# Patient Record
Sex: FEMALE | Race: WHITE | Employment: STUDENT | URBAN - METROPOLITAN AREA
[De-identification: names, ages, dates, MRNs, and addresses within clinical notes are randomized per-mention and may not be internally consistent; named-entity substitution may affect disease eponyms.]

---

## 2023-10-12 PROBLEM — E66.812 OBESITY, CLASS II, BMI 35-39.9: Status: ACTIVE | Noted: 2023-10-12

## 2024-05-02 ENCOUNTER — OFFICE VISIT (OUTPATIENT)
Dept: INTERNAL MEDICINE CLINIC | Facility: CLINIC | Age: 24
End: 2024-05-02

## 2024-05-02 VITALS
TEMPERATURE: 98.1 F | DIASTOLIC BLOOD PRESSURE: 87 MMHG | HEART RATE: 97 BPM | BODY MASS INDEX: 40.4 KG/M2 | WEIGHT: 214 LBS | SYSTOLIC BLOOD PRESSURE: 121 MMHG | HEIGHT: 61 IN

## 2024-05-02 DIAGNOSIS — Z11.3 SCREEN FOR STD (SEXUALLY TRANSMITTED DISEASE): ICD-10-CM

## 2024-05-02 DIAGNOSIS — R73.03 PREDIABETES: ICD-10-CM

## 2024-05-02 DIAGNOSIS — E66.01 CLASS 3 SEVERE OBESITY DUE TO EXCESS CALORIES WITHOUT SERIOUS COMORBIDITY WITH BODY MASS INDEX (BMI) OF 40.0 TO 44.9 IN ADULT (HCC): ICD-10-CM

## 2024-05-02 DIAGNOSIS — G43.119 INTRACTABLE MIGRAINE WITH AURA WITHOUT STATUS MIGRAINOSUS: Primary | ICD-10-CM

## 2024-05-02 DIAGNOSIS — F41.1 GENERALIZED ANXIETY DISORDER: ICD-10-CM

## 2024-05-02 DIAGNOSIS — L70.8 OTHER ACNE: ICD-10-CM

## 2024-05-02 DIAGNOSIS — L73.2 HIDRADENITIS SUPPURATIVA: ICD-10-CM

## 2024-05-02 DIAGNOSIS — R42 VERTIGO: ICD-10-CM

## 2024-05-02 DIAGNOSIS — R53.83 OTHER FATIGUE: ICD-10-CM

## 2024-05-02 DIAGNOSIS — E28.2 PCOS (POLYCYSTIC OVARIAN SYNDROME): ICD-10-CM

## 2024-05-02 PROCEDURE — 99214 OFFICE O/P EST MOD 30 MIN: CPT | Performed by: PHYSICIAN ASSISTANT

## 2024-05-02 RX ORDER — SUMATRIPTAN 100 MG/1
TABLET, FILM COATED ORAL
Qty: 9 TABLET | Refills: 4 | Status: SHIPPED | OUTPATIENT
Start: 2024-05-02

## 2024-05-02 RX ORDER — MECLIZINE HYDROCHLORIDE 25 MG/1
25 TABLET ORAL EVERY 8 HOURS SCHEDULED
Qty: 60 TABLET | Refills: 2 | Status: SHIPPED | OUTPATIENT
Start: 2024-05-02

## 2024-05-07 PROBLEM — F41.0 PANIC ATTACK: Status: RESOLVED | Noted: 2022-02-04 | Resolved: 2024-05-07

## 2024-05-07 PROBLEM — E66.01 CLASS 3 SEVERE OBESITY DUE TO EXCESS CALORIES WITHOUT SERIOUS COMORBIDITY WITH BODY MASS INDEX (BMI) OF 40.0 TO 44.9 IN ADULT (HCC): Status: ACTIVE | Noted: 2023-10-12

## 2024-05-07 PROBLEM — R42 VERTIGO: Status: ACTIVE | Noted: 2019-06-26

## 2024-05-07 PROBLEM — H69.93 EUSTACHIAN TUBE DYSFUNCTION, BILATERAL: Status: RESOLVED | Noted: 2023-05-16 | Resolved: 2024-05-07

## 2024-05-07 PROBLEM — E66.813 CLASS 3 SEVERE OBESITY DUE TO EXCESS CALORIES WITHOUT SERIOUS COMORBIDITY WITH BODY MASS INDEX (BMI) OF 40.0 TO 44.9 IN ADULT (HCC): Status: ACTIVE | Noted: 2023-10-12

## 2024-05-09 ENCOUNTER — APPOINTMENT (OUTPATIENT)
Dept: LAB | Facility: CLINIC | Age: 24
End: 2024-05-09
Payer: COMMERCIAL

## 2024-05-09 DIAGNOSIS — R53.83 OTHER FATIGUE: ICD-10-CM

## 2024-05-09 DIAGNOSIS — R42 VERTIGO: ICD-10-CM

## 2024-05-09 DIAGNOSIS — E28.2 PCOS (POLYCYSTIC OVARIAN SYNDROME): ICD-10-CM

## 2024-05-09 DIAGNOSIS — Z11.3 SCREEN FOR STD (SEXUALLY TRANSMITTED DISEASE): ICD-10-CM

## 2024-05-09 DIAGNOSIS — R73.03 PREDIABETES: ICD-10-CM

## 2024-05-09 DIAGNOSIS — G43.119 INTRACTABLE MIGRAINE WITH AURA WITHOUT STATUS MIGRAINOSUS: ICD-10-CM

## 2024-05-09 LAB
25(OH)D3 SERPL-MCNC: 18.4 NG/ML (ref 30–100)
ALBUMIN SERPL BCP-MCNC: 4.3 G/DL (ref 3.5–5)
ALP SERPL-CCNC: 62 U/L (ref 34–104)
ALT SERPL W P-5'-P-CCNC: 12 U/L (ref 7–52)
ANION GAP SERPL CALCULATED.3IONS-SCNC: 11 MMOL/L (ref 4–13)
AST SERPL W P-5'-P-CCNC: 15 U/L (ref 13–39)
BASOPHILS # BLD AUTO: 0.04 THOUSANDS/ÂΜL (ref 0–0.1)
BASOPHILS NFR BLD AUTO: 1 % (ref 0–1)
BILIRUB SERPL-MCNC: 0.22 MG/DL (ref 0.2–1)
BUN SERPL-MCNC: 11 MG/DL (ref 5–25)
CALCIUM SERPL-MCNC: 9.1 MG/DL (ref 8.4–10.2)
CHLORIDE SERPL-SCNC: 103 MMOL/L (ref 96–108)
CO2 SERPL-SCNC: 25 MMOL/L (ref 21–32)
CREAT SERPL-MCNC: 0.63 MG/DL (ref 0.6–1.3)
EOSINOPHIL # BLD AUTO: 0.13 THOUSAND/ÂΜL (ref 0–0.61)
EOSINOPHIL NFR BLD AUTO: 2 % (ref 0–6)
ERYTHROCYTE [DISTWIDTH] IN BLOOD BY AUTOMATED COUNT: 12.8 % (ref 11.6–15.1)
EST. AVERAGE GLUCOSE BLD GHB EST-MCNC: 123 MG/DL
GFR SERPL CREATININE-BSD FRML MDRD: 127 ML/MIN/1.73SQ M
GLUCOSE P FAST SERPL-MCNC: 98 MG/DL (ref 65–99)
HBA1C MFR BLD: 5.9 %
HCT VFR BLD AUTO: 40.1 % (ref 34.8–46.1)
HGB BLD-MCNC: 12.9 G/DL (ref 11.5–15.4)
IMM GRANULOCYTES # BLD AUTO: 0.02 THOUSAND/UL (ref 0–0.2)
IMM GRANULOCYTES NFR BLD AUTO: 0 % (ref 0–2)
LYMPHOCYTES # BLD AUTO: 2.57 THOUSANDS/ÂΜL (ref 0.6–4.47)
LYMPHOCYTES NFR BLD AUTO: 33 % (ref 14–44)
MCH RBC QN AUTO: 28.4 PG (ref 26.8–34.3)
MCHC RBC AUTO-ENTMCNC: 32.2 G/DL (ref 31.4–37.4)
MCV RBC AUTO: 88 FL (ref 82–98)
MONOCYTES # BLD AUTO: 0.59 THOUSAND/ÂΜL (ref 0.17–1.22)
MONOCYTES NFR BLD AUTO: 8 % (ref 4–12)
NEUTROPHILS # BLD AUTO: 4.45 THOUSANDS/ÂΜL (ref 1.85–7.62)
NEUTS SEG NFR BLD AUTO: 56 % (ref 43–75)
NRBC BLD AUTO-RTO: 0 /100 WBCS
PLATELET # BLD AUTO: 377 THOUSANDS/UL (ref 149–390)
PMV BLD AUTO: 9.6 FL (ref 8.9–12.7)
POTASSIUM SERPL-SCNC: 3.9 MMOL/L (ref 3.5–5.3)
PROT SERPL-MCNC: 7.7 G/DL (ref 6.4–8.4)
RBC # BLD AUTO: 4.55 MILLION/UL (ref 3.81–5.12)
SODIUM SERPL-SCNC: 139 MMOL/L (ref 135–147)
TSH SERPL DL<=0.05 MIU/L-ACNC: 2.38 UIU/ML (ref 0.45–4.5)
VIT B12 SERPL-MCNC: 138 PG/ML (ref 180–914)
WBC # BLD AUTO: 7.8 THOUSAND/UL (ref 4.31–10.16)

## 2024-05-09 PROCEDURE — 83498 ASY HYDROXYPROGESTERONE 17-D: CPT

## 2024-05-09 PROCEDURE — 36415 COLL VENOUS BLD VENIPUNCTURE: CPT

## 2024-05-09 PROCEDURE — 87591 N.GONORRHOEAE DNA AMP PROB: CPT

## 2024-05-09 PROCEDURE — 83036 HEMOGLOBIN GLYCOSYLATED A1C: CPT

## 2024-05-09 PROCEDURE — 87491 CHLMYD TRACH DNA AMP PROBE: CPT

## 2024-05-09 PROCEDURE — 84402 ASSAY OF FREE TESTOSTERONE: CPT

## 2024-05-09 PROCEDURE — 85025 COMPLETE CBC W/AUTO DIFF WBC: CPT

## 2024-05-09 PROCEDURE — 84443 ASSAY THYROID STIM HORMONE: CPT

## 2024-05-09 PROCEDURE — 82626 DEHYDROEPIANDROSTERONE: CPT

## 2024-05-09 PROCEDURE — 84403 ASSAY OF TOTAL TESTOSTERONE: CPT

## 2024-05-09 PROCEDURE — 82607 VITAMIN B-12: CPT

## 2024-05-09 PROCEDURE — 80053 COMPREHEN METABOLIC PANEL: CPT

## 2024-05-09 PROCEDURE — 82306 VITAMIN D 25 HYDROXY: CPT

## 2024-05-09 NOTE — ASSESSMENT & PLAN NOTE
Lab Results   Component Value Date    HGBA1C 5.9 (H) 12/01/2023      On metformin to treat PCOS as well. Due for repeat A1c. See nutrition and exercise counseling.

## 2024-05-09 NOTE — ASSESSMENT & PLAN NOTE
History of PCOS with signs of insulin resistance. Derm discussed spironolactone, which was prescribed, but patient does not want to start at this time. Still declines trying this medication today. She is on OCPs, prescribed by GYN. She did not increase her metformin 500 mg to TID yet, but states she will start this week. Agreeable to recheck lab work. Also recommend follow up with GYN.

## 2024-05-09 NOTE — ASSESSMENT & PLAN NOTE
Reviewed nutrition and exercise recommendations. She is interested in weight loss medications. Will discuss further pending lab results.

## 2024-05-09 NOTE — ASSESSMENT & PLAN NOTE
Discussed risk factors and screening for ANTONIO. Recommend sleep study. She declines at this time, but states she will call the office if she changes her mind. Will evaluate further with labs.

## 2024-05-09 NOTE — PROGRESS NOTES
Name: Mariella Lowery      : 2000      MRN: [id number]  Encounter Provider: Moira Soliz PA-C  Encounter Date: 2024   Encounter department: [facility]    Assessment & Plan     1. Intractable migraine with aura without status migrainosus  Assessment & Plan:  Unremarkable neuro exam. No red flags. Discussed preventative medication. She is not interested at this time. Agreeable to screening lab work. Imitrex increased to 100 mg prn. Max 200 mg/24 hours. Will switch to Maxalt if necessary at next visit. Take at onset, as soon as possible. Recommend supportive treatment. Ensure adequate hydration and rest. Avoid triggers. Follow up with eye doctor and wear glasses as instructed. Avoid alcohol and caffeine. Encouraged healthy well balanced diet. Avoid rebound and medication over use headaches. ER precautions given.      Orders:  -     CBC and differential; Future  -     SUMAtriptan (IMITREX) 100 mg tablet; Take one tablet at onset of headache and take a second tablet as needed 2 hours later. Max two tablets in 24 hours    2. Vertigo  Assessment & Plan:  Will evaluate further with labs for reversible causes. Ensure adequate hydration and rest. Avoid irritants. Avoid alcohol and caffeine. Encouraged healthy well balanced diet. Meclizine as needed. ER precautions given.    Orders:  -     Comprehensive metabolic panel; Future  -     meclizine (ANTIVERT) 25 mg tablet; Take 1 tablet (25 mg total) by mouth every 8 (eight) hours    3. Other fatigue  Assessment & Plan:  Discussed risk factors and screening for ANTONIO. Recommend sleep study. She declines at this time, but states she will call the office if she changes her mind. Will evaluate further with labs.    Orders:  -     Vitamin B12; Future  -     Vitamin D 25 hydroxy; Future    4. PCOS (polycystic ovarian syndrome)  Assessment & Plan:  History of PCOS with signs of insulin resistance. Derm discussed spironolactone, which  was prescribed, but patient does not want to start at this time. Still declines trying this medication today. She is on OCPs, prescribed by GYN. She did not increase her metformin 500 mg to TID yet, but states she will start this week. Agreeable to recheck lab work. Also recommend follow up with GYN.          Orders:  -     TSH, 3rd generation with Free T4 reflex; Future  -     17-Hydroxyprogesterone; Future  -     Testosterone, free, total; Future  -     DHEA; Future    5. Prediabetes  Assessment & Plan:  Lab Results   Component Value Date    HGBA1C 5.9 (H) 12/01/2023      On metformin to treat PCOS as well. Due for repeat A1c. See nutrition and exercise counseling.     Orders:  -     Hemoglobin A1C; Future    6. Hidradenitis suppurativa  Assessment & Plan:  Followed by dermatology. Uses clindamycin lotion daily.       7. Other acne  Assessment & Plan:  Followed by derm. Use Retin-A and clindamycin lotion daily.       8. Generalized anxiety disorder  Assessment & Plan:  States she is doing well. Continue Lexapro 20 mg daily and BuSpar 5 mg BID. She is still looking/considering therapy.       9. Screen for STD (sexually transmitted disease)  -     Chlamydia/GC amplified DNA by PCR; Future    10. Class 3 severe obesity due to excess calories without serious comorbidity with body mass index (BMI) of 40.0 to 44.9 in adult (HCC)  Assessment & Plan:  Reviewed nutrition and exercise recommendations. She is interested in weight loss medications. Will discuss further pending lab results.        BMI Counseling: Body mass index is 40.43 kg/m². The BMI is above normal. Nutrition recommendations include decreasing portion sizes, encouraging healthy choices of fruits and vegetables, decreasing fast food intake, consuming healthier snacks, limiting drinks that contain sugar, moderation in carbohydrate intake, increasing intake of lean protein, reducing intake of saturated and trans fat and reducing intake of cholesterol. Exercise  recommendations include moderate physical activity 150 minutes/week, exercising 3-5 times per week and strength training exercises. No pharmacotherapy was ordered. Rationale for BMI follow-up plan is due to patient being overweight or obese.         Subjective      Patient is a 23 year old female with a PMH of anxiety, depression, and PCOS presenting for a follow up on headaches and vertigo. She states she has had a migraines/headaches for one month. No trigger or provocation that she is aware of. States she does have a history of migraines and does have a preceding aura. She has pain in her entire head. States it is sharp and throbbing. The severity has varied, currently 5/10 pain. Admits to light and sound sensitivity. Denies visual changes. Currently experiencing about 3 per week. May last an hour, or all day. She takes the Imitrex 50 mg as needed which helps, but sometimes has to take a second one. She has not tried anything else for this yet.   She has felt dizzy and lightheaded intermittently, mostly with movement. States she feels motion sick. No appetite. Admits to nausea, no vomiting. Denies abdominal pain. Denies numbness, tingling, or weakness. Denies cold or flu-like symptoms. Denies any other associated symptoms. This has happened separate from the headaches. States it occurred two days last week, but not since, in a week.   She did go to the ER the day of the migraines starting and states they gave her a migraine cocktail in IV. States she still had a headache, but symptom were improved at that time. States this is consistent with her previous migraines, but they usually do not last this long. Typically only gets migraines occasionally.  She is supposed to wear glasses, but doesn't wear them.      Review of Systems   Constitutional:  Positive for fatigue. Negative for appetite change, chills, diaphoresis, fever and unexpected weight change.   HENT:  Negative for congestion, sore throat, tinnitus and  "trouble swallowing.    Eyes:  Positive for photophobia (light sensitivity). Negative for visual disturbance.   Respiratory:  Negative for cough, chest tightness, shortness of breath and wheezing.    Cardiovascular:  Negative for chest pain, palpitations and leg swelling.   Gastrointestinal:  Negative for abdominal pain, blood in stool, constipation, diarrhea, nausea and vomiting.   Endocrine: Negative for cold intolerance, heat intolerance, polydipsia, polyphagia and polyuria.   Musculoskeletal:  Negative for arthralgias and myalgias.   Skin:  Negative for rash.   Neurological:  Positive for dizziness, light-headedness and headaches. Negative for tremors, syncope, weakness and numbness.   Hematological:  Negative for adenopathy.   Psychiatric/Behavioral:  Negative for dysphoric mood, self-injury, sleep disturbance and suicidal ideas. The patient is not nervous/anxious.        Current Outpatient Medications on File Prior to Visit   Medication Sig    busPIRone (BUSPAR) 5 mg tablet Take 1 tablet (5 mg total) by mouth 2 (two) times a day    clindamycin (CLEOCIN T) 1 % lotion Apply once daily to the face, armpits, and groin area. Use over the counter benzoyl peroxide wash once daily.    escitalopram (LEXAPRO) 20 mg tablet Take 1 tablet (20 mg total) by mouth daily    metFORMIN (GLUCOPHAGE-XR) 500 mg 24 hr tablet Take 1 tablet (500 mg total) by mouth 3 (three) times a day    norethindrone-ethinyl estradiol (JUNEL FE 1/20) 1-20 MG-MCG per tablet Take 1 tablet by mouth daily for 28 days    Retin-A 0.1 % cream Apply topically daily at bedtime Spread one pea-sized amount of medication over entire face about one hour before bedtime.       Objective     /87 (BP Location: Left arm, Patient Position: Sitting, Cuff Size: Large)   Pulse 97   Temp 98.1 °F (36.7 °C) (Temporal)   Ht 5' 1\" (1.549 m)   Wt 97.1 kg (214 lb)   BMI 40.43 kg/m²     Physical Exam  Vitals and nursing note reviewed.   Constitutional:       General: " She is awake. She is not in acute distress.     Appearance: Normal appearance. She is well-developed and well-groomed. She is obese. She is not ill-appearing.   HENT:      Head: Normocephalic and atraumatic.      Right Ear: Tympanic membrane, ear canal and external ear normal.      Left Ear: Tympanic membrane, ear canal and external ear normal.      Nose: Nose normal.      Mouth/Throat:      Lips: Pink.      Mouth: Mucous membranes are moist.      Pharynx: Oropharynx is clear. Uvula midline. No oropharyngeal exudate or posterior oropharyngeal erythema.   Eyes:      General: Lids are normal.      Extraocular Movements: Extraocular movements intact.      Right eye: Normal extraocular motion and no nystagmus.      Left eye: Normal extraocular motion and no nystagmus.      Conjunctiva/sclera: Conjunctivae normal.   Cardiovascular:      Rate and Rhythm: Normal rate and regular rhythm.      Pulses: Normal pulses.           Radial pulses are 2+ on the right side and 2+ on the left side.      Heart sounds: Normal heart sounds. No murmur heard.  Pulmonary:      Effort: Pulmonary effort is normal. No respiratory distress.      Breath sounds: Normal breath sounds and air entry. No decreased air movement. No decreased breath sounds, wheezing, rhonchi or rales.   Abdominal:      General: Abdomen is flat. Bowel sounds are normal. There is no distension.      Palpations: Abdomen is soft. There is no mass.      Tenderness: There is no abdominal tenderness. There is no right CVA tenderness, left CVA tenderness, guarding or rebound.      Hernia: No hernia is present.   Musculoskeletal:         General: Normal range of motion.      Cervical back: Full passive range of motion without pain, normal range of motion and neck supple.      Right lower leg: No edema.      Left lower leg: No edema.   Lymphadenopathy:      Cervical: No cervical adenopathy.   Skin:     General: Skin is warm.   Neurological:      General: No focal deficit  present.      Mental Status: She is alert and oriented to person, place, and time. Mental status is at baseline.      Sensory: Sensation is intact.      Motor: Motor function is intact.      Coordination: Coordination is intact.      Gait: Gait is intact.      Comments: Upper and lower extremity strength 5/5, equal and symmetric. Normal tone.    Psychiatric:         Attention and Perception: Attention normal.         Mood and Affect: Mood and affect normal.         Speech: Speech normal.         Behavior: Behavior normal. Behavior is cooperative.       Moira Soliz PA-C

## 2024-05-09 NOTE — ASSESSMENT & PLAN NOTE
Unremarkable neuro exam. No red flags. Discussed preventative medication. She is not interested at this time. Agreeable to screening lab work. Imitrex increased to 100 mg prn. Max 200 mg/24 hours. Will switch to Maxalt if necessary at next visit. Take at onset, as soon as possible. Recommend supportive treatment. Ensure adequate hydration and rest. Avoid triggers. Follow up with eye doctor and wear glasses as instructed. Avoid alcohol and caffeine. Encouraged healthy well balanced diet. Avoid rebound and medication over use headaches. ER precautions given.

## 2024-05-09 NOTE — ASSESSMENT & PLAN NOTE
Will evaluate further with labs for reversible causes. Ensure adequate hydration and rest. Avoid irritants. Avoid alcohol and caffeine. Encouraged healthy well balanced diet. Meclizine as needed. ER precautions given.

## 2024-05-09 NOTE — ASSESSMENT & PLAN NOTE
States she is doing well. Continue Lexapro 20 mg daily and BuSpar 5 mg BID. She is still looking/considering therapy.

## 2024-05-10 LAB
C TRACH DNA SPEC QL NAA+PROBE: NEGATIVE
N GONORRHOEA DNA SPEC QL NAA+PROBE: NEGATIVE
TESTOST FREE SERPL-MCNC: 1.4 PG/ML (ref 0–4.2)
TESTOST SERPL-MCNC: 25 NG/DL (ref 13–71)

## 2024-05-15 LAB — 17OHP SERPL-MCNC: 27 NG/DL

## 2024-05-18 LAB — DHEA SERPL-MCNC: 192 NG/DL (ref 31–701)

## 2024-05-20 DIAGNOSIS — E55.9 VITAMIN D DEFICIENCY: ICD-10-CM

## 2024-05-20 DIAGNOSIS — E53.8 B12 DEFICIENCY: Primary | ICD-10-CM

## 2024-05-20 RX ORDER — CYANOCOBALAMIN (VITAMIN B-12) 500 MCG
500 TABLET ORAL DAILY
Qty: 90 TABLET | Refills: 3 | Status: SHIPPED | OUTPATIENT
Start: 2024-05-20

## 2024-06-11 DIAGNOSIS — F41.1 GENERALIZED ANXIETY DISORDER: ICD-10-CM

## 2024-06-11 RX ORDER — ESCITALOPRAM OXALATE 20 MG/1
20 TABLET ORAL DAILY
Qty: 30 TABLET | Refills: 5 | Status: SHIPPED | OUTPATIENT
Start: 2024-06-11

## 2024-06-18 ENCOUNTER — TELEPHONE (OUTPATIENT)
Dept: INTERNAL MEDICINE CLINIC | Facility: CLINIC | Age: 24
End: 2024-06-18

## 2024-07-06 DIAGNOSIS — Z30.9 ENCOUNTER FOR CONTRACEPTIVE MANAGEMENT, UNSPECIFIED TYPE: ICD-10-CM

## 2024-07-08 RX ORDER — NORETHINDRONE ACETATE AND ETHINYL ESTRADIOL AND FERROUS FUMARATE 1MG-20(21)
1 KIT ORAL DAILY
Qty: 84 TABLET | Refills: 1 | Status: SHIPPED | OUTPATIENT
Start: 2024-07-08

## 2024-07-22 ENCOUNTER — ANNUAL EXAM (OUTPATIENT)
Dept: OBGYN CLINIC | Facility: CLINIC | Age: 24
End: 2024-07-22

## 2024-07-22 VITALS
HEIGHT: 61 IN | WEIGHT: 211.2 LBS | DIASTOLIC BLOOD PRESSURE: 60 MMHG | BODY MASS INDEX: 39.88 KG/M2 | SYSTOLIC BLOOD PRESSURE: 112 MMHG

## 2024-07-22 DIAGNOSIS — Z01.419 ENCOUNTER FOR GYNECOLOGICAL EXAMINATION WITHOUT ABNORMAL FINDING: Primary | ICD-10-CM

## 2024-07-22 DIAGNOSIS — Z11.3 SCREEN FOR STD (SEXUALLY TRANSMITTED DISEASE): ICD-10-CM

## 2024-07-22 DIAGNOSIS — N94.6 DYSMENORRHEA: ICD-10-CM

## 2024-07-22 PROCEDURE — 99395 PREV VISIT EST AGE 18-39: CPT | Performed by: PHYSICIAN ASSISTANT

## 2024-07-22 PROCEDURE — 87491 CHLMYD TRACH DNA AMP PROBE: CPT | Performed by: PHYSICIAN ASSISTANT

## 2024-07-22 PROCEDURE — G0145 SCR C/V CYTO,THINLAYER,RESCR: HCPCS | Performed by: PHYSICIAN ASSISTANT

## 2024-07-22 PROCEDURE — 87591 N.GONORRHOEAE DNA AMP PROB: CPT | Performed by: PHYSICIAN ASSISTANT

## 2024-07-22 RX ORDER — ACETAMINOPHEN AND CODEINE PHOSPHATE 120; 12 MG/5ML; MG/5ML
1 SOLUTION ORAL DAILY
Qty: 28 TABLET | Refills: 3 | Status: SHIPPED | OUTPATIENT
Start: 2024-07-22

## 2024-07-22 NOTE — PATIENT INSTRUCTIONS
Follow up with dermatology.    Rx for Letha x 3 refills sent to pharmacy.  Stop current pill and start new pill today.    Practice consistent condom use for STD prevention.    Call with problems.

## 2024-07-22 NOTE — PROGRESS NOTES
Assessment/Plan:      Diagnoses and all orders for this visit:    Encounter for gynecological examination without abnormal finding  -     Liquid-based pap, screening    Screen for STD (sexually transmitted disease)  -     Chlamydia/GC amplified DNA by PCR    Dysmenorrhea  -     norethindrone (Letha) 0.35 MG tablet; Take 1 tablet (0.35 mg total) by mouth daily        Pap and GC/chlamydia screening done.  We will call with STD testing results.  Recommended f/u with dermatology for further HS evaluation and treatment.  Need to switch to POP secondary to migraine with aura.  Rx for Letha x 3 refills sent to pharmacy.  Stop current OCP and start new medication today.  Stressed consistent condom use for STD prevention.  F/u in 3 mos for birth control check.  Call with problems in the interim.    Subjective:     Patient ID: Mariella Lowery is a 24 y.o. female.    Patient is here for annual exam.  States she is doing well.  No complaints on her OCP.  Periods are regular every 28 days, and bleeding lasts for 5 days.  She denies any heavy bleeding, severe cramping, headache, and mood symptoms.  Requests refills today.  Of note, patient was given diagnosis of migraine with aura in April.  She has been sexually active with a new partner.  Patient denies any change in bowel/bladder habits, pelvic pain, bloating, abdominal pain, n/v, change in appetite, and thyroid disease.    Patient is performing self-breast exam.  Denies new masses, skin changes, nipple discharge, and pain/tenderness.  Complains of an increase in frequency of HS boils under breasts and in groin area.  Followed by dermatology; last seen a year ago.      Review of Systems   Constitutional:  Negative for appetite change and unexpected weight change.   Cardiovascular:         No masses, skin changes, nipple discharge, and pain/tenderness.   Gastrointestinal:  Negative for abdominal distention, abdominal pain, constipation, diarrhea, nausea and vomiting.  "  Genitourinary:  Negative for difficulty urinating, dysuria, frequency, genital sores, hematuria, menstrual problem, pelvic pain, vaginal bleeding, vaginal discharge and vaginal pain.   Neurological:  Positive for headaches.         Objective:  Visit Vitals  /60 (BP Location: Left arm, Patient Position: Sitting, Cuff Size: Adult)   Ht 5' 1\" (1.549 m)   Wt 95.8 kg (211 lb 3.2 oz)   LMP 07/06/2024 (Exact Date)   BMI 39.91 kg/m²   OB Status Birth Control   Smoking Status Never   BSA 1.93 m²         Physical Exam  Vitals reviewed. Exam conducted with a chaperone present.   Constitutional:       Appearance: Normal appearance. She is well-developed. She is obese.   Neck:      Thyroid: No thyromegaly.   Pulmonary:      Effort: Pulmonary effort is normal.   Chest:   Breasts:     Breasts are symmetrical.      Right: Normal. No swelling, bleeding, inverted nipple, mass, nipple discharge, skin change or tenderness.      Left: Normal. No swelling, bleeding, inverted nipple, mass, nipple discharge, skin change or tenderness.          Comments: Two nearly healed boils.  Abdominal:      General: There is no distension.      Palpations: Abdomen is soft.      Tenderness: There is no abdominal tenderness.   Genitourinary:     General: Normal vulva.      Pubic Area: No rash.       Labia:         Right: No rash, tenderness, lesion or injury.         Left: No rash, tenderness, lesion or injury.       Vagina: Normal. No vaginal discharge, erythema, tenderness or bleeding.      Cervix: Normal.      Uterus: Normal.       Adnexa: Right adnexa normal and left adnexa normal.        Right: No mass, tenderness or fullness.          Left: No mass, tenderness or fullness.        Comments: Scarring from HS on b/l inner thighs.  Musculoskeletal:      Cervical back: Neck supple.   Lymphadenopathy:      Cervical: No cervical adenopathy.      Upper Body:      Right upper body: No supraclavicular or axillary adenopathy.      Left upper body: No " supraclavicular or axillary adenopathy.      Lower Body: No right inguinal adenopathy. No left inguinal adenopathy.   Skin:     General: Skin is warm and dry.   Neurological:      Mental Status: She is alert and oriented to person, place, and time.   Psychiatric:         Behavior: Behavior normal. Behavior is cooperative.         Thought Content: Thought content normal.         Judgment: Judgment normal.

## 2024-07-24 LAB
C TRACH DNA SPEC QL NAA+PROBE: NEGATIVE
N GONORRHOEA DNA SPEC QL NAA+PROBE: NEGATIVE

## 2024-07-26 LAB
LAB AP GYN PRIMARY INTERPRETATION: NORMAL
Lab: NORMAL

## 2024-08-17 DIAGNOSIS — E28.2 PCOS (POLYCYSTIC OVARIAN SYNDROME): ICD-10-CM

## 2024-08-19 RX ORDER — METFORMIN HCL 500 MG
500 TABLET, EXTENDED RELEASE 24 HR ORAL 3 TIMES DAILY
Qty: 90 TABLET | Refills: 2 | Status: SHIPPED | OUTPATIENT
Start: 2024-08-19

## 2024-09-11 ENCOUNTER — TELEPHONE (OUTPATIENT)
Dept: INTERNAL MEDICINE CLINIC | Facility: CLINIC | Age: 24
End: 2024-09-11

## 2024-09-11 NOTE — TELEPHONE ENCOUNTER
----- Message from Moira Soliz PA-C sent at 9/11/2024 11:21 AM EDT -----  Please call patient to schedule annual physical. Thank you

## 2024-10-10 ENCOUNTER — TELEPHONE (OUTPATIENT)
Age: 24
End: 2024-10-10

## 2024-10-10 NOTE — TELEPHONE ENCOUNTER
If 4th week is active week, can just take whole pack with no period.  If iron or placebo pills, patient can skip.  Thanks.

## 2024-10-10 NOTE — TELEPHONE ENCOUNTER
Patient states that last 2 pill packs received from pharmacy are different than she previously received. Previous packs had week of placebo pills. Her 2 new packs do not - they have all active pills.     Patient would like to take for 3 weeks and have week off to have menses. Inquiring if this is safe to do - just not take the 4th week.     Routing to provider for review.

## 2024-10-10 NOTE — TELEPHONE ENCOUNTER
Patient states 4th week is active pills but she does not want to skip her menses.     Please advise.

## 2024-10-10 NOTE — TELEPHONE ENCOUNTER
Reassure patient she does not need to have a period on her birth control.  There is no problem if she does not get a period.  Would need to switch to a different pill otherwise.

## 2024-10-24 ENCOUNTER — OFFICE VISIT (OUTPATIENT)
Dept: OBGYN CLINIC | Facility: CLINIC | Age: 24
End: 2024-10-24

## 2024-10-24 VITALS
HEIGHT: 61 IN | WEIGHT: 217.4 LBS | SYSTOLIC BLOOD PRESSURE: 116 MMHG | DIASTOLIC BLOOD PRESSURE: 78 MMHG | BODY MASS INDEX: 41.04 KG/M2

## 2024-10-24 DIAGNOSIS — Z30.41 ENCOUNTER FOR SURVEILLANCE OF CONTRACEPTIVE PILLS: Primary | ICD-10-CM

## 2024-10-24 DIAGNOSIS — N94.6 DYSMENORRHEA: ICD-10-CM

## 2024-10-24 PROCEDURE — 99213 OFFICE O/P EST LOW 20 MIN: CPT | Performed by: PHYSICIAN ASSISTANT

## 2024-10-24 RX ORDER — ACETAMINOPHEN AND CODEINE PHOSPHATE 120; 12 MG/5ML; MG/5ML
1 SOLUTION ORAL DAILY
Qty: 84 TABLET | Refills: 3 | Status: SHIPPED | OUTPATIENT
Start: 2024-10-24

## 2024-10-24 NOTE — PROGRESS NOTES
"Assessment/Plan:      Diagnoses and all orders for this visit:    Encounter for surveillance of contraceptive pills    Dysmenorrhea  -     norethindrone (Letha) 0.35 MG tablet; Take 1 tablet (0.35 mg total) by mouth daily        Discussed pill with patient.  Irregular bleeding is common with progesterone only birth control.  Continue to take medication every day at the same time; do not skip doses.  Refills sent to pharmacy. Stressed consistent condom use for STD prevention.  F/u in July for yearly gyn exam.  Call with problems in the interim.    Subjective:     Patient ID: Mariella Lowery is a 24 y.o. female.    Patient is here for birth control f/u.  Was switched to POP in July secondary to migraine with aura.  Patient states she has been having bleeding about once a week.  Flow is usually spotting, but did have heavier bleeding this month.  Also notes some cramping.  Denies bowel/bladder changes, bloating, abdominal pain, n/v, and change in appetite.  Taking pill at the same time; has not missed any doses.  Would like to stay on this medication.        Review of Systems   Constitutional:  Negative for appetite change and unexpected weight change.   Gastrointestinal:  Negative for abdominal distention, abdominal pain, constipation, diarrhea, nausea and vomiting.   Genitourinary:  Negative for difficulty urinating, dysuria, frequency, genital sores, hematuria, menstrual problem, pelvic pain, urgency, vaginal bleeding, vaginal discharge and vaginal pain.         Objective:  Visit Vitals  /78 (BP Location: Left arm, Patient Position: Sitting, Cuff Size: Adult)   Ht 5' 1\" (1.549 m)   Wt 98.6 kg (217 lb 6.4 oz)   LMP 10/15/2024 (Exact Date)   BMI 41.08 kg/m²   OB Status Having periods   Smoking Status Never   BSA 1.96 m²         Physical Exam  Vitals reviewed.   Constitutional:       Appearance: Normal appearance. She is well-developed. She is obese.   Neurological:      Mental Status: She is alert and " oriented to person, place, and time.   Psychiatric:         Mood and Affect: Mood normal.         Behavior: Behavior normal. Behavior is cooperative.         Thought Content: Thought content normal.         Judgment: Judgment normal.

## 2024-10-24 NOTE — PATIENT INSTRUCTIONS
Refills of pill sent to pharmacy.  Take medication every day at the same time; do not skip doses.    Practice consistent condom use for STD prevention.    Call with problems.

## 2025-01-27 ENCOUNTER — HOSPITAL ENCOUNTER (EMERGENCY)
Facility: HOSPITAL | Age: 25
Discharge: HOME | End: 2025-01-27
Attending: EMERGENCY MEDICINE
Payer: COMMERCIAL

## 2025-01-27 VITALS
TEMPERATURE: 97.8 F | RESPIRATION RATE: 18 BRPM | SYSTOLIC BLOOD PRESSURE: 135 MMHG | HEART RATE: 112 BPM | OXYGEN SATURATION: 100 % | DIASTOLIC BLOOD PRESSURE: 114 MMHG

## 2025-01-27 DIAGNOSIS — R11.2 NAUSEA VOMITING AND DIARRHEA: Primary | ICD-10-CM

## 2025-01-27 DIAGNOSIS — R19.7 NAUSEA VOMITING AND DIARRHEA: Primary | ICD-10-CM

## 2025-01-27 LAB
EXT PREGNANCY TEST URINE: NEGATIVE
EXT. CONTROL: NORMAL

## 2025-01-27 PROCEDURE — 99284 EMERGENCY DEPT VISIT MOD MDM: CPT

## 2025-01-27 PROCEDURE — 99284 EMERGENCY DEPT VISIT MOD MDM: CPT | Performed by: EMERGENCY MEDICINE

## 2025-01-27 PROCEDURE — 81025 URINE PREGNANCY TEST: CPT

## 2025-01-27 RX ORDER — ONDANSETRON 4 MG/1
4 TABLET, ORALLY DISINTEGRATING ORAL ONCE
Status: COMPLETED | OUTPATIENT
Start: 2025-01-27 | End: 2025-01-27

## 2025-01-27 RX ORDER — LOPERAMIDE HYDROCHLORIDE 2 MG/1
2 CAPSULE ORAL ONCE
Status: COMPLETED | OUTPATIENT
Start: 2025-01-27 | End: 2025-01-27

## 2025-01-27 RX ORDER — ONDANSETRON 4 MG/1
4 TABLET, ORALLY DISINTEGRATING ORAL EVERY 6 HOURS PRN
Qty: 12 TABLET | Refills: 0 | Status: SHIPPED | OUTPATIENT
Start: 2025-01-27 | End: 2025-01-30

## 2025-01-27 RX ADMIN — ONDANSETRON 4 MG: 4 TABLET, ORALLY DISINTEGRATING ORAL at 11:41

## 2025-01-27 RX ADMIN — LOPERAMIDE HYDROCHLORIDE 2 MG: 2 CAPSULE ORAL at 11:58

## 2025-01-27 RX ADMIN — ONDANSETRON 4 MG: 4 TABLET, ORALLY DISINTEGRATING ORAL at 10:35

## 2025-01-27 NOTE — DISCHARGE INSTRUCTIONS
You were evaluated in the Emergency Department today for Nausea, vomiting, and diarrhea.    Can take motrin and tylenol together every 6 hours for pain and fever control. Take zofran as needed for nausea. Take immodium for diarrhea.    Please schedule an appointment with your primary care physician within the next 2-3 days.    Return to the Emergency Department if you experience worsening or uncontrolled pain, fevers 100.4°F or greater, recurrent vomiting, inability to tolerate food or fluids by mouth, bloody stools or vomit, black or tarry stools, or any other concerning symptoms.    Thank you for choosing us for your care.

## 2025-01-27 NOTE — ED ATTENDING ATTESTATION
1/27/2025  I, Valente Alex DO, saw and evaluated the patient. I have discussed the patient with the resident/non-physician practitioner and agree with the resident's/non-physician practitioner's findings, Plan of Care, and MDM as documented in the resident's/non-physician practitioner's note, except where noted. All available labs and Radiology studies were reviewed.  I was present for key portions of any procedure(s) performed by the resident/non-physician practitioner and I was immediately available to provide assistance.       At this point I agree with the current assessment done in the Emergency Department.  I have conducted an independent evaluation of this patient a history and physical is as follows:    24F with nausea and vomiting, started this am, diarrhea noited as well. Given zofran tolerating po in the ed.   Ddx- gastroenteritis, po challenge and zofran   ED Course         Critical Care Time  Procedures

## 2025-01-27 NOTE — Clinical Note
Mariella Lowery was seen and treated in our emergency department on 1/27/2025.    No restrictions            Diagnosis:     Mariella  may return to work on return date.    She may return on this date: 01/29/2025         If you have any questions or concerns, please don't hesitate to call.      Arnie Bose, DO    ______________________________           _______________          _______________  Hospital Representative                              Date                                Time

## 2025-02-01 NOTE — ED PROVIDER NOTES
Time reflects when diagnosis was documented in both MDM as applicable and the Disposition within this note       Time User Action Codes Description Comment    1/27/2025 12:07 PM JuiceArnie Add [R11.2,  R19.7] Nausea vomiting and diarrhea           ED Disposition       ED Disposition   Discharge    Condition   Stable    Date/Time   Mon Jan 27, 2025 12:07 PM    Comment   Mariella Juarez Sebastián discharge to home/self care.                   Assessment & Plan       Medical Decision Making  24-year-old female with past medical history of vertigo, depression, PCOS presents to the ED for evaluation of NBNB vomiting and NB diarrhea since 5:00 this morning.  No known sick contact.  No recent travels.  Patient denies recent illness, fever, chills, chest pain, shortness of breath.    On exam patient hypertensive and tachycardic other vital signs within normal limits satting at 100% on room air.  Abdomen soft nontender.  Oral mucosa dry.  Lungs clear to auscultation bilaterally.  Heart regular rate rhythm.    Symptoms likely due to gastroenteritis.  Will treat symptomatically with Zofran and Imodium.  Upon reevaluation patient reports improvement in her symptoms.  Patient discharged home with instructions follow-up with PCP.  Strict return precaution given    Amount and/or Complexity of Data Reviewed  Labs: ordered.    Risk  Prescription drug management.             Medications   ondansetron (ZOFRAN-ODT) dispersible tablet 4 mg (4 mg Oral Given 1/27/25 1035)   ondansetron (ZOFRAN-ODT) dispersible tablet 4 mg (4 mg Oral Given 1/27/25 1141)   loperamide (IMODIUM) capsule 2 mg (2 mg Oral Given 1/27/25 1158)       ED Risk Strat Scores                                              History of Present Illness       Chief Complaint   Patient presents with    Vomiting     Pt c/o vomiting and diarrhea since 0500       Past Medical History:   Diagnosis Date    Depression     PCOS (polycystic ovarian syndrome)     Vertigo       No past surgical  history on file.   Family History   Problem Relation Age of Onset    No Known Problems Mother     No Known Problems Father     No Known Problems Brother     No Known Problems Brother     No Known Problems Brother     Heart defect Brother     Breast cancer Maternal Grandmother     Diabetes Maternal Grandfather       Social History     Tobacco Use    Smoking status: Never    Smokeless tobacco: Never   Vaping Use    Vaping status: Never Used   Substance Use Topics    Alcohol use: Yes     Comment: socially    Drug use: Never      E-Cigarette/Vaping    E-Cigarette Use Never User       E-Cigarette/Vaping Substances    Nicotine No     THC No     CBD No     Flavoring No     Other No     Unknown No       I have reviewed and agree with the history as documented.       Vomiting  Associated symptoms: diarrhea    Associated symptoms: no abdominal pain, no arthralgias, no chills, no cough, no fever, no headaches and no sore throat        Review of Systems   Constitutional:  Negative for appetite change, chills, diaphoresis, fever and unexpected weight change.   HENT:  Negative for dental problem, ear pain, facial swelling, sore throat and trouble swallowing.    Eyes:  Negative for pain and visual disturbance.   Respiratory:  Negative for cough, chest tightness and shortness of breath.    Cardiovascular:  Negative for chest pain, palpitations and leg swelling.   Gastrointestinal:  Positive for diarrhea, nausea and vomiting. Negative for abdominal distention, abdominal pain and constipation.   Endocrine: Negative for polyuria.   Genitourinary:  Negative for difficulty urinating, dysuria and hematuria.   Musculoskeletal:  Negative for arthralgias and back pain.   Skin:  Negative for color change and rash.   Neurological:  Negative for dizziness, seizures, syncope, light-headedness and headaches.   Psychiatric/Behavioral:  Negative for confusion.    All other systems reviewed and are negative.          Objective       ED Triage  Vitals   Temperature Pulse Blood Pressure Respirations SpO2 Patient Position - Orthostatic VS   01/27/25 0951 01/27/25 0952 01/27/25 0952 01/27/25 0952 01/27/25 0952 01/27/25 0952   97.8 °F (36.6 °C) (!) 112 (!) 135/114 18 100 % Lying      Temp Source Heart Rate Source BP Location FiO2 (%) Pain Score    01/27/25 0951 01/27/25 0952 01/27/25 0952 -- 01/27/25 0951    Temporal Monitor Right arm  7      Vitals      Date and Time Temp Pulse SpO2 Resp BP Pain Score FACES Pain Rating User   01/27/25 0952 -- 112 100 % 18 135/114 -- -- KV   01/27/25 0951 97.8 °F (36.6 °C) -- -- -- -- 7 -- KV            Physical Exam  Vitals and nursing note reviewed.   Constitutional:       General: She is not in acute distress.     Appearance: Normal appearance. She is well-developed. She is not ill-appearing.   HENT:      Head: Normocephalic and atraumatic.      Right Ear: External ear normal.      Left Ear: External ear normal.      Nose: Nose normal.      Mouth/Throat:      Mouth: Mucous membranes are dry.   Eyes:      General: No scleral icterus.        Right eye: No discharge.      Extraocular Movements: Extraocular movements intact.      Conjunctiva/sclera: Conjunctivae normal.   Cardiovascular:      Rate and Rhythm: Regular rhythm. Tachycardia present.      Pulses: Normal pulses.      Heart sounds: No murmur heard.  Pulmonary:      Effort: Pulmonary effort is normal. No respiratory distress.      Breath sounds: Normal breath sounds. No wheezing.   Chest:      Chest wall: No tenderness.   Abdominal:      General: Abdomen is flat. There is no distension.      Palpations: Abdomen is soft.      Tenderness: There is no abdominal tenderness.   Musculoskeletal:         General: No swelling, deformity or signs of injury. Normal range of motion.      Cervical back: Normal range of motion and neck supple. No rigidity or tenderness.      Right lower leg: No edema.      Left lower leg: No edema.   Skin:     General: Skin is warm and dry.       Capillary Refill: Capillary refill takes less than 2 seconds.   Neurological:      General: No focal deficit present.      Mental Status: She is alert and oriented to person, place, and time.      Motor: No weakness.   Psychiatric:         Mood and Affect: Mood normal.         Results Reviewed       Procedure Component Value Units Date/Time    POCT pregnancy, urine [[id number]]  (Normal) Collected: 01/27/25 1158    Lab Status: Final result Updated: 01/27/25 1158     EXT Preg Test, Ur Negative     Control Valid            No orders to display       Procedures    ED Medication and Procedure Management   Prior to Admission Medications   Prescriptions Last Dose Informant Patient Reported? Taking?   Cholecalciferol (VITAMIN D3) 1,000 units tablet   No No   Sig: Take 2 tablets (2,000 Units total) by mouth daily   Patient not taking: Reported on 10/24/2024   Retin-A 0.1 % cream   No No   Sig: Apply topically daily at bedtime Spread one pea-sized amount of medication over entire face about one hour before bedtime.   SUMAtriptan (IMITREX) 100 mg tablet   No No   Sig: Take one tablet at onset of headache and take a second tablet as needed 2 hours later. Max two tablets in 24 hours   busPIRone (BUSPAR) 5 mg tablet   No No   Sig: Take 1 tablet (5 mg total) by mouth 2 (two) times a day   Patient not taking: Reported on 10/24/2024   clindamycin (CLEOCIN T) 1 % lotion   No No   Sig: Apply once daily to the face, armpits, and groin area. Use over the counter benzoyl peroxide wash once daily.   escitalopram (LEXAPRO) 20 mg tablet   No No   Sig: TAKE 1 TABLET BY MOUTH EVERY DAY   Patient not taking: Reported on 10/24/2024   meclizine (ANTIVERT) 25 mg tablet   No No   Sig: Take 1 tablet (25 mg total) by mouth every 8 (eight) hours   metFORMIN (GLUCOPHAGE-XR) 500 mg 24 hr tablet   No No   Sig: TAKE 1 TABLET BY MOUTH THREE TIMES A DAY   Patient not taking: Reported on 10/24/2024   norethindrone (Letha) 0.35 MG tablet   No No   Sig: Take  1 tablet (0.35 mg total) by mouth daily   vitamin B-12 (CYANOCOBALAMIN) 500 MCG TABS   No No   Sig: Take 1 tablet (500 mcg total) by mouth daily   Patient not taking: Reported on 10/24/2024      Facility-Administered Medications: None     Discharge Medication List as of 1/27/2025 12:09 PM        START taking these medications    Details   ondansetron (ZOFRAN-ODT) 4 mg disintegrating tablet Take 1 tablet (4 mg total) by mouth every 6 (six) hours as needed for nausea or vomiting for up to 3 days, Starting Mon 1/27/2025, Until Thu 1/30/2025 at 2359, Normal           CONTINUE these medications which have NOT CHANGED    Details   busPIRone (BUSPAR) 5 mg tablet Take 1 tablet (5 mg total) by mouth 2 (two) times a day, Starting Fri 12/1/2023, Normal      Cholecalciferol (VITAMIN D3) 1,000 units tablet Take 2 tablets (2,000 Units total) by mouth daily, Starting Mon 5/20/2024, Normal      clindamycin (CLEOCIN T) 1 % lotion Apply once daily to the face, armpits, and groin area. Use over the counter benzoyl peroxide wash once daily., Normal      escitalopram (LEXAPRO) 20 mg tablet TAKE 1 TABLET BY MOUTH EVERY DAY, Starting Tue 6/11/2024, Normal      meclizine (ANTIVERT) 25 mg tablet Take 1 tablet (25 mg total) by mouth every 8 (eight) hours, Starting Thu 5/2/2024, Normal      metFORMIN (GLUCOPHAGE-XR) 500 mg 24 hr tablet TAKE 1 TABLET BY MOUTH THREE TIMES A DAY, Starting Mon 8/19/2024, Normal      norethindrone (Letha) 0.35 MG tablet Take 1 tablet (0.35 mg total) by mouth daily, Starting Thu 10/24/2024, Normal      Retin-A 0.1 % cream Apply topically daily at bedtime Spread one pea-sized amount of medication over entire face about one hour before bedtime., Starting Wed 6/21/2023, Normal      SUMAtriptan (IMITREX) 100 mg tablet Take one tablet at onset of headache and take a second tablet as needed 2 hours later. Max two tablets in 24 hours, Normal      vitamin B-12 (CYANOCOBALAMIN) 500 MCG TABS Take 1 tablet (500 mcg total) by  mouth daily, Starting Mon 5/20/2024, Normal           No discharge procedures on file.  ED SEPSIS DOCUMENTATION   Time reflects when diagnosis was documented in both MDM as applicable and the Disposition within this note       Time User Action Codes Description Comment    1/27/2025 12:07 PM Arnie Bose Add [R11.2,  R19.7] Nausea vomiting and diarrhea                  Arnie Bose DO  02/01/25 1732

## 2025-04-01 ENCOUNTER — TELEPHONE (OUTPATIENT)
Age: 25
End: 2025-04-01

## 2025-04-01 NOTE — TELEPHONE ENCOUNTER
Pt of Dr Maldonado calling to scheduled for HS flare (technically considered NEW to derm)    Pt has seen her at [facility] before, but only for inner thigh area. She stated her HS is worsening and spreading to under her breasts as well.    Advised pt Dr Maldonado is technically still a resident and under a different schedule w/us. I would see if theres a way for her to be seen at [facility] vs [facility]    Advised pt I will call her back once I speak to Dr Maldonado [phone]

## 2025-04-01 NOTE — TELEPHONE ENCOUNTER
Spoke w/Irma GOMEZ and verified Dr Maldonado is no longer with us    Offered cancellation 4/8 at 9am w/Dr Lynn as pt wanted female physician. Georgette will book.    Pt accepted appt, verified ins, provided address and asked pt to arrive 15 mins early.

## 2025-04-08 ENCOUNTER — OFFICE VISIT (OUTPATIENT)
Dept: DERMATOLOGY | Facility: CLINIC | Age: 25
End: 2025-04-08
Payer: COMMERCIAL

## 2025-04-08 VITALS — TEMPERATURE: 97.5 F

## 2025-04-08 DIAGNOSIS — L73.2 HIDRADENITIS SUPPURATIVA: Primary | ICD-10-CM

## 2025-04-08 PROCEDURE — 99214 OFFICE O/P EST MOD 30 MIN: CPT | Performed by: DERMATOLOGY

## 2025-04-08 RX ORDER — CLINDAMYCIN PHOSPHATE 10 UG/ML
LOTION TOPICAL
Qty: 60 ML | Refills: 5 | Status: SHIPPED | OUTPATIENT
Start: 2025-04-08

## 2025-04-08 RX ORDER — METFORMIN HYDROCHLORIDE 500 MG/1
TABLET, EXTENDED RELEASE ORAL
Qty: 60 TABLET | Refills: 5 | Status: SHIPPED | OUTPATIENT
Start: 2025-04-08

## 2025-04-08 RX ORDER — CLINDAMYCIN PHOSPHATE 10 UG/ML
LOTION TOPICAL DAILY
Qty: 60 ML | Status: CANCELLED | OUTPATIENT
Start: 2025-04-08

## 2025-04-08 NOTE — PATIENT INSTRUCTIONS
HIDRADENITIS SUPPURATIVA    Assessment and Plan:  Based on a thorough discussion of this condition and the management approach to it (including a comprehensive discussion of the known risks, side effects and potential benefits of treatment), the patient (family) agrees to implement the following specific plan:  Start Metformin 500 mg daily. Start with one tablet daily then, if tolerated start taking two daily (one in the morning and one at night)  Start Clindamycin lotion daily after washing with Hibiclens or Benzoyl peroxide   Start Hibiclens or a 4% Benzoyl peroxide wash like PanOxyl. Do not apply Hibiclens above the neck. It is toxic to the ears and eyes        What is hidradenitis suppurativa?  Hidradenitis suppurativa is an inflammatory skin disease that affects apocrine gland-bearing skin in the axillae, in the groin, and under the breasts. It is characterised by recurrent boil-like nodules and abscesses that culminate in pus-like discharge, difficult-to-heal open wounds (sinuses) and scarring. Hidradenitis suppurativa also has significant psychological impact and many patients suffer from impairment of body image, depression and anxiety.     The term hidradenitis implies it starts as an inflammatory disorder of sweat glands, which is now known to be incorrect. Hidradenitis suppurativa is also known as acne inversa.    Who gets hidradenitis suppurativa?  Hidradenitis often starts at puberty, and is most active between the ages of 20 and 40 years, and in women, can resolve at menopause. It is three times more common in females than in males. Risk factors include:  Other family members with hidradenitis suppurativa  Obesity and insulin resistance/metabolic syndrome  Cigarette smoking  Follicular occlusion disorders: acne conglobata, dissecting cellulitis, pilonidal sinus  Inflammatory bowel disease (Crohn disease)  Rare autoinflammatory syndromes associated with abnormalities of PSTPIP1 gene.*    * PAPA syndrome  (pyogenic arthritis, pyoderma gangrenosum and acne), PASH syndrome (pyoderma gangrenosum, acne, suppurative hidradenitis) and PAPASH syndrome (pyogenic arthritis, pyoderma gangrenosum, acne, suppurative hidradenitis).    What causes hidradenitis suppurativa?  Hidradenitis suppurativa is an autoinflammatory disorder. Although the exact cause is not yet understood, contributing factors include:  Friction from clothing and body folds  Aberrant immune response to commensal bacteria  Abnormal cutaneous or follicular microbiome  Follicular occlusion  Release of pro-inflammatory cytokines  Inflammation causing rupture of the follicular wall and destroying apocrine glands and ducts  Secondary bacterial infection  Certain drugs.    What are the clinical features of hidradenitis suppurativa?  Hidradenitis can affect a single or multiple areas in the armpits, neck, sub mammary area, and inner thighs. Anogenital involvement most commonly affects the groin, mons pubis, vulva (in females), sides of the scrotum (in males), perineum, buttocks and perianal folds.  Signs include:  Open and closed comedones  Painful firm papules, larger nodules and pleated ridges  Pustules, fluctuant pseudocysts and abscesses  Pyogenic granulomas  Draining sinuses linking inflammatory lesions  Hypertrophic and atrophic scars.    Many patients with hidradenitis suppurativa also suffer from other skin disorders, including acne, hirsutism and psoriasis.    The severity and extent of hidradenitis suppurativa is recorded at assessment and when determining the impact of a treatment. The Belcher system describes three distinct clinical stages:  Solitary or multiple, isolated abscess formation without scarring or sinus tracts  Recurrent abscesses, single or multiple widely  lesions, with sinus tract formation  Diffuse or broad involvement, with multiple interconnected sinus tracts and abscesses.    Severe hidradenitis (Belcher Stage 3) has been  associated with:  Male sex  Axillary and perianal involvement  Obesity  Smoking  Higher risk of stroke, coronary artery disease, heart failure, and peripheral artery disease  Disease duration.    What is the treatment for hidradenitis suppurativa?  General measures  Weight loss; follow an anti-inflammatory, low-sugar, low-grain, low-dairy diet (mainly plants)  Smoking cessation: this can lead to improvement within a few months  Loose fitting clothing  Daily unfragranced antiperspirants  If prone to secondary infection, wash with antiseptics or take bleach baths  Apply hydrogen peroxide solution or medical grade honey to reduce malodour  Use peeling agents such as resorcinol 15% cream to de-roof nodules  Apply simple dressings to draining sinuses  Analgesics, such as paracetamol (acetaminophen), for pain control  Seek help to manage anxiety and depression.    Medical management of hidradenitis suppurativa  Medical management of hidradenitis suppurativa is difficult. Treatment is required long term. Effective options are listed below.    Antibiotics  Topical clindamycin, with benzoyl peroxide to reduce bacterial resistance  Short course of oral antibiotics for acute staphylococcal abscesses, eg flucloxacillin  Prolonged courses (minimum 3 months) of tetracycline, metronidazole, trimethoprim + sulphamethoxazole, fluoroquinolones, ertapenem or dapsone for their anti-inflammatory action  6-12 week courses of the combination of clindamycin (or doxycycline) and rifampicin for severe disease.    Antiandrogens  Long-term oral contraceptive pill; antiandrogenic progesterones drospirenone or cyproterone acetate may be more effective than standard combined pills. These are more suitable than progesterone-only pills or devices.  Spironolactone and finasteride  Response takes 6 months or longer.    Immunomodulatory treatments for severe disease  Intralesional corticosteroids into nodules  Systemic corticosteroids short-term for  flares  Methotrexate, ciclosporin, and azathioprine  TNF-? inhibitors adalimumab and infliximab, used in higher dose than required for psoriasis, are the most successful treatments to date. Note that paradoxically, they may sometimes induce new-onset hidradenitis suppurativa  Other biologics are under investigation, such as the IL-1? antagonist, canakinumab    Other medical treatments  Metformin in patients with insulin resistance  Acitretin (unsuitable for females of childbearing potential)  Isotretinoin -- effective for acne but appears unhelpful for most cases of hidradenitis  Colchicine  Medical management of anxiety and depression    Surgical management of hidradenitis suppurativa  Incision and drainage of acute abscesses  Curettage and deroofing of nodules, abscesses and sinuses  Laser ablation of nodules, abscesses and sinuses  Wide local excision of persistent nodules  Radical excisional surgery of entire affected area  Nd:YAG laser hair removal

## 2025-04-08 NOTE — PROGRESS NOTES
"[facility] Note     Patient Name: Mariella Lwoery  Encounter Date: 4/8/25     Have you been cared for by a [facility] Dermatologist in the last 3 years and, if so, which description applies to you?    Yes.  I have been here within the last 3 years, and my medical history has NOT changed since that time.  I am FEMALE/of child-bearing potential.    REVIEW OF SYSTEMS:  Have you recently had or currently have any of the following? No changes in my recent health.   PAST MEDICAL HISTORY:  Have you personally ever had or currently have any of the following?  If \"YES,\" then please provide more detail. No changes in my medical history.   HISTORY OF IMMUNOSUPPRESSION: Do you have a history of any of the following:  Systemic Immunosuppression such as Diabetes, Biologic or Immunotherapy, Chemotherapy, Organ Transplantation, Bone Marrow Transplantation or Prednisone?  No     Answering \"YES\" requires the addition of the dotphrase \"IMMUNOSUPPRESSED\" as the first diagnosis of the patient's visit.   FAMILY HISTORY:  Any \"first degree relatives\" (parent, brother, sister, or child) with the following?    No changes in my family's known health.   PATIENT EXPERIENCE:    Do you want the Dermatologist to perform a COMPLETE skin exam today including a clinical examination under the \"bra and underwear\" areas?  NO  If necessary, do we have your permission to call and leave a detailed message on your Preferred Phone number that includes your specific medical information?  Yes      Allergies   Allergen Reactions    Shellfish-Derived Products - Food Allergy Hives and Swelling      Current Outpatient Medications:     meclizine (ANTIVERT) 25 mg tablet, Take 1 tablet (25 mg total) by mouth every 8 (eight) hours, Disp: 60 tablet, Rfl: 2    norethindrone (Letha) 0.35 MG tablet, Take 1 tablet (0.35 mg total) by mouth daily, Disp: 84 tablet, Rfl: 3    Retin-A 0.1 % cream, Apply topically daily at bedtime Spread one pea-sized " amount of medication over entire face about one hour before bedtime., Disp: 45 g, Rfl: 0    SUMAtriptan (IMITREX) 100 mg tablet, Take one tablet at onset of headache and take a second tablet as needed 2 hours later. Max two tablets in 24 hours, Disp: 9 tablet, Rfl: 4    busPIRone (BUSPAR) 5 mg tablet, Take 1 tablet (5 mg total) by mouth 2 (two) times a day (Patient not taking: Reported on 4/8/2025), Disp: 60 tablet, Rfl: 5    Cholecalciferol (VITAMIN D3) 1,000 units tablet, Take 2 tablets (2,000 Units total) by mouth daily (Patient not taking: Reported on 4/8/2025), Disp: 180 tablet, Rfl: 3    clindamycin (CLEOCIN T) 1 % lotion, Apply once daily to the face, armpits, and groin area. Use over the counter benzoyl peroxide wash once daily. (Patient not taking: Reported on 4/8/2025), Disp: 60 mL, Rfl: 12    escitalopram (LEXAPRO) 20 mg tablet, TAKE 1 TABLET BY MOUTH EVERY DAY (Patient not taking: Reported on 4/8/2025), Disp: 30 tablet, Rfl: 5    metFORMIN (GLUCOPHAGE-XR) 500 mg 24 hr tablet, TAKE 1 TABLET BY MOUTH THREE TIMES A DAY (Patient not taking: Reported on 4/8/2025), Disp: 90 tablet, Rfl: 2    ondansetron (ZOFRAN-ODT) 4 mg disintegrating tablet, Take 1 tablet (4 mg total) by mouth every 6 (six) hours as needed for nausea or vomiting for up to 3 days, Disp: 12 tablet, Rfl: 0    vitamin B-12 (CYANOCOBALAMIN) 500 MCG TABS, Take 1 tablet (500 mcg total) by mouth daily (Patient not taking: Reported on 4/8/2025), Disp: 90 tablet, Rfl: 3          Whom besides the patient is providing clinical information about today's encounter?   NO ADDITIONAL HISTORIAN (patient alone provided history)    Physical Exam and Assessment/Plan by Diagnosis:    HIDRADENITIS SUPPURATIVA- SMITH STAGE 2    Physical Exam:  Anatomic Location Affected:  inner thighs, axilla, b/l breasts, inframammary  Morphological Description:  hyperpigmented nodules and cysts, PIH, and scars. Groin not examined      Additional History of Present Condition: 24  yo female with PCOS diagnosed with hidradenitis around 2 years ago at [facility] and started on clindamycin, zinc, spironolactone, and metformin. She just recently had a flare on right breast but its starting to calm down. She is currently taking norethindrone (Letha) 0.35 mg. She is not currently using anything due to insurance lapse. She has tried doxycycline, spironolactone, clindamycin, and Metformin with little to no improvement. Does note that she had history of insulin resistance/elevated glucose previously.    Assessment and Plan: Discussed numerous options to include oral spironolactone, doxycycline, metformin, topical options, as well as injectable biologics. Patient prefers to avoid the biologics at this time. Also discussed option to excise any persistent or non healing lesion. She was offered all the above options and elected the below treatment plan.    Based on a thorough discussion of this condition and the management approach to it (including a comprehensive discussion of the known risks, side effects and potential benefits of treatment), the patient (family) agrees to implement the following specific plan:  Start Metformin  mg daily. Start with one tablet daily then, if tolerated start taking two daily (one in the morning and one at night) after 7 days. Risks of GI upset reviewed. Patient tolerated it will in the past  Start Clindamycin lotion daily after washing with Hibiclens (do not apply above neck due to radha and ocular toxicity) or Benzoyl peroxide   Follow up in 3-4 months  Strongly suggest escalation of therapy to spironlactone, doxycycline, or injectable biologic at that time if not improving        What is hidradenitis suppurativa?  Hidradenitis suppurativa is an inflammatory skin disease that affects apocrine gland-bearing skin in the axillae, in the groin, and under the breasts. It is characterised by recurrent boil-like nodules and abscesses that culminate in pus-like  discharge, difficult-to-heal open wounds (sinuses) and scarring. Hidradenitis suppurativa also has significant psychological impact and many patients suffer from impairment of body image, depression and anxiety.     The term hidradenitis implies it starts as an inflammatory disorder of sweat glands, which is now known to be incorrect. Hidradenitis suppurativa is also known as acne inversa.    Who gets hidradenitis suppurativa?  Hidradenitis often starts at puberty, and is most active between the ages of 20 and 40 years, and in women, can resolve at menopause. It is three times more common in females than in males. Risk factors include:  Other family members with hidradenitis suppurativa  Obesity and insulin resistance/metabolic syndrome  Cigarette smoking  Follicular occlusion disorders: acne conglobata, dissecting cellulitis, pilonidal sinus  Inflammatory bowel disease (Crohn disease)  Rare autoinflammatory syndromes associated with abnormalities of PSTPIP1 gene.*    * PAPA syndrome (pyogenic arthritis, pyoderma gangrenosum and acne), PASH syndrome (pyoderma gangrenosum, acne, suppurative hidradenitis) and PAPASH syndrome (pyogenic arthritis, pyoderma gangrenosum, acne, suppurative hidradenitis).    What causes hidradenitis suppurativa?  Hidradenitis suppurativa is an autoinflammatory disorder. Although the exact cause is not yet understood, contributing factors include:  Friction from clothing and body folds  Aberrant immune response to commensal bacteria  Abnormal cutaneous or follicular microbiome  Follicular occlusion  Release of pro-inflammatory cytokines  Inflammation causing rupture of the follicular wall and destroying apocrine glands and ducts  Secondary bacterial infection  Certain drugs.    What are the clinical features of hidradenitis suppurativa?  Hidradenitis can affect a single or multiple areas in the armpits, neck, sub mammary area, and inner thighs. Anogenital involvement most commonly affects  the groin, mons pubis, vulva (in females), sides of the scrotum (in males), perineum, buttocks and perianal folds.  Signs include:  Open and closed comedones  Painful firm papules, larger nodules and pleated ridges  Pustules, fluctuant pseudocysts and abscesses  Pyogenic granulomas  Draining sinuses linking inflammatory lesions  Hypertrophic and atrophic scars.    Many patients with hidradenitis suppurativa also suffer from other skin disorders, including acne, hirsutism and psoriasis.    The severity and extent of hidradenitis suppurativa is recorded at assessment and when determining the impact of a treatment. The Belcher system describes three distinct clinical stages:  Solitary or multiple, isolated abscess formation without scarring or sinus tracts  Recurrent abscesses, single or multiple widely  lesions, with sinus tract formation  Diffuse or broad involvement, with multiple interconnected sinus tracts and abscesses.    Severe hidradenitis (Belcher Stage 3) has been associated with:  Male sex  Axillary and perianal involvement  Obesity  Smoking  Higher risk of stroke, coronary artery disease, heart failure, and peripheral artery disease  Disease duration.    What is the treatment for hidradenitis suppurativa?  General measures  Weight loss; follow an anti-inflammatory, low-sugar, low-grain, low-dairy diet (mainly plants)  Smoking cessation: this can lead to improvement within a few months  Loose fitting clothing  Daily unfragranced antiperspirants  If prone to secondary infection, wash with antiseptics or take bleach baths  Apply hydrogen peroxide solution or medical grade honey to reduce malodour  Use peeling agents such as resorcinol 15% cream to de-roof nodules  Apply simple dressings to draining sinuses  Analgesics, such as paracetamol (acetaminophen), for pain control  Seek help to manage anxiety and depression.    Medical management of hidradenitis suppurativa  Medical management of hidradenitis  suppurativa is difficult. Treatment is required long term. Effective options are listed below.    Antibiotics  Topical clindamycin, with benzoyl peroxide to reduce bacterial resistance  Short course of oral antibiotics for acute staphylococcal abscesses, eg flucloxacillin  Prolonged courses (minimum 3 months) of tetracycline, metronidazole, trimethoprim + sulphamethoxazole, fluoroquinolones, ertapenem or dapsone for their anti-inflammatory action  6-12 week courses of the combination of clindamycin (or doxycycline) and rifampicin for severe disease.    Antiandrogens  Long-term oral contraceptive pill; antiandrogenic progesterones drospirenone or cyproterone acetate may be more effective than standard combined pills. These are more suitable than progesterone-only pills or devices.  Spironolactone and finasteride  Response takes 6 months or longer.    Immunomodulatory treatments for severe disease  Intralesional corticosteroids into nodules  Systemic corticosteroids short-term for flares  Methotrexate, ciclosporin, and azathioprine  TNF-? inhibitors adalimumab and infliximab, used in higher dose than required for psoriasis, are the most successful treatments to date. Note that paradoxically, they may sometimes induce new-onset hidradenitis suppurativa  Other biologics are under investigation, such as the IL-1? antagonist, canakinumab    Other medical treatments  Metformin in patients with insulin resistance  Acitretin (unsuitable for females of childbearing potential)  Isotretinoin -- effective for acne but appears unhelpful for most cases of hidradenitis  Colchicine  Medical management of anxiety and depression    Surgical management of hidradenitis suppurativa  Incision and drainage of acute abscesses  Curettage and deroofing of nodules, abscesses and sinuses  Laser ablation of nodules, abscesses and sinuses  Wide local excision of persistent nodules  Radical excisional surgery of entire affected area  Nd:YAG laser  hair removal     Scribe Attestation      I,:  Sameera Wilkins MA am acting as a scribe while in the presence of the attending physician.:       I,:  Lucy Lynn MD personally performed the services described in this documentation    as scribed in my presence.:

## 2025-04-08 NOTE — LETTER
April 8, 2025     Patient: Mariella Lowery  YOB: 2000  Date of Visit: 4/8/2025      To Whom it May Concern:    Mariella Lowery is under my professional care. Mariella was seen in my office on 4/8/2025. Mariella may return to work on 4/8/25 .    If you have any questions or concerns, please don't hesitate to call.         Sincerely,          Lucy Lynn MD

## 2025-04-18 ENCOUNTER — TELEPHONE (OUTPATIENT)
Dept: INTERNAL MEDICINE CLINIC | Facility: CLINIC | Age: 25
End: 2025-04-18

## 2025-04-18 NOTE — TELEPHONE ENCOUNTER
Patient said she couldn't come because she was working.     Will call back to reschedule at a later date.

## 2025-07-11 ENCOUNTER — TELEPHONE (OUTPATIENT)
Dept: INTERNAL MEDICINE CLINIC | Facility: CLINIC | Age: 25
End: 2025-07-11

## 2025-07-28 ENCOUNTER — ANNUAL EXAM (OUTPATIENT)
Dept: OBGYN CLINIC | Facility: CLINIC | Age: 25
End: 2025-07-28
Payer: COMMERCIAL

## 2025-07-28 VITALS
BODY MASS INDEX: 41.35 KG/M2 | HEIGHT: 61 IN | DIASTOLIC BLOOD PRESSURE: 82 MMHG | WEIGHT: 219 LBS | SYSTOLIC BLOOD PRESSURE: 122 MMHG

## 2025-07-28 DIAGNOSIS — N94.6 DYSMENORRHEA: ICD-10-CM

## 2025-07-28 DIAGNOSIS — Z11.3 SCREEN FOR STD (SEXUALLY TRANSMITTED DISEASE): ICD-10-CM

## 2025-07-28 DIAGNOSIS — Z01.419 ENCOUNTER FOR GYNECOLOGICAL EXAMINATION WITHOUT ABNORMAL FINDING: Primary | ICD-10-CM

## 2025-07-28 DIAGNOSIS — E28.2 PCOS (POLYCYSTIC OVARIAN SYNDROME): ICD-10-CM

## 2025-07-28 LAB — SL AMB POCT URINE HCG: NEGATIVE

## 2025-07-28 PROCEDURE — 87591 N.GONORRHOEAE DNA AMP PROB: CPT | Performed by: PHYSICIAN ASSISTANT

## 2025-07-28 PROCEDURE — 87491 CHLMYD TRACH DNA AMP PROBE: CPT | Performed by: PHYSICIAN ASSISTANT

## 2025-07-28 PROCEDURE — G0143 SCR C/V CYTO,THINLAYER,RESCR: HCPCS | Performed by: PHYSICIAN ASSISTANT

## 2025-07-28 PROCEDURE — 81025 URINE PREGNANCY TEST: CPT | Performed by: PHYSICIAN ASSISTANT

## 2025-07-28 PROCEDURE — S0612 ANNUAL GYNECOLOGICAL EXAMINA: HCPCS | Performed by: PHYSICIAN ASSISTANT

## 2025-07-28 RX ORDER — ACETAMINOPHEN AND CODEINE PHOSPHATE 120; 12 MG/5ML; MG/5ML
1 SOLUTION ORAL DAILY
Qty: 84 TABLET | Refills: 3 | Status: SHIPPED | OUTPATIENT
Start: 2025-07-28

## 2025-07-29 LAB
C TRACH DNA SPEC QL NAA+PROBE: NEGATIVE
N GONORRHOEA DNA SPEC QL NAA+PROBE: NEGATIVE

## 2025-08-01 LAB
LAB AP GYN PRIMARY INTERPRETATION: NORMAL
Lab: NORMAL